# Patient Record
Sex: FEMALE | Race: OTHER | URBAN - METROPOLITAN AREA
[De-identification: names, ages, dates, MRNs, and addresses within clinical notes are randomized per-mention and may not be internally consistent; named-entity substitution may affect disease eponyms.]

---

## 2017-05-13 ENCOUNTER — EMERGENCY (EMERGENCY)
Facility: HOSPITAL | Age: 42
LOS: 1 days | Discharge: HOME | End: 2017-05-13
Attending: EMERGENCY MEDICINE | Admitting: EMERGENCY MEDICINE
Payer: MEDICAID

## 2017-05-13 VITALS
HEART RATE: 83 BPM | TEMPERATURE: 98 F | OXYGEN SATURATION: 100 % | DIASTOLIC BLOOD PRESSURE: 85 MMHG | RESPIRATION RATE: 16 BRPM | SYSTOLIC BLOOD PRESSURE: 122 MMHG

## 2017-05-13 VITALS
DIASTOLIC BLOOD PRESSURE: 72 MMHG | TEMPERATURE: 98 F | RESPIRATION RATE: 16 BRPM | OXYGEN SATURATION: 100 % | SYSTOLIC BLOOD PRESSURE: 125 MMHG | HEART RATE: 80 BPM

## 2017-05-13 DIAGNOSIS — R42 DIZZINESS AND GIDDINESS: ICD-10-CM

## 2017-05-13 DIAGNOSIS — R11.2 NAUSEA WITH VOMITING, UNSPECIFIED: ICD-10-CM

## 2017-05-13 LAB
ALBUMIN SERPL ELPH-MCNC: 3.9 G/DL — SIGNIFICANT CHANGE UP (ref 3.4–5)
ALP SERPL-CCNC: 55 U/L — SIGNIFICANT CHANGE UP (ref 40–120)
ALT FLD-CCNC: 22 U/L — SIGNIFICANT CHANGE UP (ref 12–42)
ANION GAP SERPL CALC-SCNC: 7 MMOL/L — LOW (ref 9–16)
APPEARANCE UR: CLEAR — SIGNIFICANT CHANGE UP
AST SERPL-CCNC: 20 U/L — SIGNIFICANT CHANGE UP (ref 15–37)
BILIRUB SERPL-MCNC: 0.3 MG/DL — SIGNIFICANT CHANGE UP (ref 0.2–1.2)
BILIRUB UR-MCNC: NEGATIVE — SIGNIFICANT CHANGE UP
BUN SERPL-MCNC: 9 MG/DL — SIGNIFICANT CHANGE UP (ref 7–23)
CALCIUM SERPL-MCNC: 9.3 MG/DL — SIGNIFICANT CHANGE UP (ref 8.5–10.5)
CHLORIDE SERPL-SCNC: 103 MMOL/L — SIGNIFICANT CHANGE UP (ref 96–108)
CO2 SERPL-SCNC: 30 MMOL/L — SIGNIFICANT CHANGE UP (ref 22–31)
COLOR SPEC: YELLOW — SIGNIFICANT CHANGE UP
CREAT SERPL-MCNC: 0.62 MG/DL — SIGNIFICANT CHANGE UP (ref 0.5–1.3)
DIFF PNL FLD: (no result)
GLUCOSE SERPL-MCNC: 107 MG/DL — HIGH (ref 70–99)
GLUCOSE UR QL: NEGATIVE — SIGNIFICANT CHANGE UP
HCG UR QL: NEGATIVE — SIGNIFICANT CHANGE UP
HCT VFR BLD CALC: 36.9 % — SIGNIFICANT CHANGE UP (ref 34.5–45)
HGB BLD-MCNC: 12.9 G/DL — SIGNIFICANT CHANGE UP (ref 11.5–15.5)
HIV 1 & 2 AB SERPL IA.RAPID: SIGNIFICANT CHANGE UP
KETONES UR-MCNC: NEGATIVE — SIGNIFICANT CHANGE UP
LEUKOCYTE ESTERASE UR-ACNC: NEGATIVE — SIGNIFICANT CHANGE UP
MCHC RBC-ENTMCNC: 30.7 PG — SIGNIFICANT CHANGE UP (ref 27–34)
MCHC RBC-ENTMCNC: 35 G/DL — SIGNIFICANT CHANGE UP (ref 32–36)
MCV RBC AUTO: 87.9 FL — SIGNIFICANT CHANGE UP (ref 80–100)
NITRITE UR-MCNC: NEGATIVE — SIGNIFICANT CHANGE UP
PH UR: 6 — SIGNIFICANT CHANGE UP (ref 5–8)
PLATELET # BLD AUTO: 218 K/UL — SIGNIFICANT CHANGE UP (ref 150–400)
POTASSIUM SERPL-MCNC: 3.5 MMOL/L — SIGNIFICANT CHANGE UP (ref 3.5–5.3)
POTASSIUM SERPL-SCNC: 3.5 MMOL/L — SIGNIFICANT CHANGE UP (ref 3.5–5.3)
PROT SERPL-MCNC: 7.9 G/DL — SIGNIFICANT CHANGE UP (ref 6.4–8.2)
PROT UR-MCNC: NEGATIVE MG/DL — SIGNIFICANT CHANGE UP
RBC # BLD: 4.2 M/UL — SIGNIFICANT CHANGE UP (ref 3.8–5.2)
RBC # FLD: 11.9 % — SIGNIFICANT CHANGE UP (ref 10.3–16.9)
SODIUM SERPL-SCNC: 140 MMOL/L — SIGNIFICANT CHANGE UP (ref 132–145)
SP GR SPEC: <=1.005 — SIGNIFICANT CHANGE UP (ref 1–1.03)
UROBILINOGEN FLD QL: 0.2 E.U./DL — SIGNIFICANT CHANGE UP
WBC # BLD: 9.6 K/UL — SIGNIFICANT CHANGE UP (ref 3.8–10.5)
WBC # FLD AUTO: 9.6 K/UL — SIGNIFICANT CHANGE UP (ref 3.8–10.5)

## 2017-05-13 PROCEDURE — 99284 EMERGENCY DEPT VISIT MOD MDM: CPT | Mod: 25

## 2017-05-13 PROCEDURE — 93010 ELECTROCARDIOGRAM REPORT: CPT

## 2017-05-13 RX ORDER — KETOROLAC TROMETHAMINE 30 MG/ML
30 SYRINGE (ML) INJECTION ONCE
Qty: 0 | Refills: 0 | Status: DISCONTINUED | OUTPATIENT
Start: 2017-05-13 | End: 2017-05-13

## 2017-05-13 RX ORDER — ONDANSETRON 8 MG/1
1 TABLET, FILM COATED ORAL
Qty: 15 | Refills: 0 | OUTPATIENT
Start: 2017-05-13

## 2017-05-13 RX ORDER — SODIUM CHLORIDE 9 MG/ML
1000 INJECTION INTRAMUSCULAR; INTRAVENOUS; SUBCUTANEOUS ONCE
Qty: 0 | Refills: 0 | Status: COMPLETED | OUTPATIENT
Start: 2017-05-13 | End: 2017-05-13

## 2017-05-13 RX ORDER — ONDANSETRON 8 MG/1
8 TABLET, FILM COATED ORAL ONCE
Qty: 0 | Refills: 0 | Status: COMPLETED | OUTPATIENT
Start: 2017-05-13 | End: 2017-05-13

## 2017-05-13 RX ORDER — ONDANSETRON 8 MG/1
4 TABLET, FILM COATED ORAL ONCE
Qty: 0 | Refills: 0 | Status: COMPLETED | OUTPATIENT
Start: 2017-05-13 | End: 2017-05-13

## 2017-05-13 RX ADMIN — SODIUM CHLORIDE 1000 MILLILITER(S): 9 INJECTION INTRAMUSCULAR; INTRAVENOUS; SUBCUTANEOUS at 15:40

## 2017-05-13 RX ADMIN — ONDANSETRON 8 MILLIGRAM(S): 8 TABLET, FILM COATED ORAL at 21:26

## 2017-05-13 RX ADMIN — ONDANSETRON 4 MILLIGRAM(S): 8 TABLET, FILM COATED ORAL at 16:09

## 2017-05-13 RX ADMIN — Medication 30 MILLIGRAM(S): at 21:00

## 2017-05-13 NOTE — ED PROVIDER NOTE - CONSTITUTIONAL, MLM
normal... Well appearing, well nourished, awake, alert, oriented to person, place, time/situation. Appears in mild distress, reports nausea.

## 2017-05-13 NOTE — ED PROVIDER NOTE - NS ED MD SCRIBE ATTENDING SCRIBE SECTIONS
HIV/DISPOSITION/VITAL SIGNS( Pullset)/HISTORY OF PRESENT ILLNESS/REVIEW OF SYSTEMS/PHYSICAL EXAM/RESULTS/PAST MEDICAL/SURGICAL/SOCIAL HISTORY

## 2017-05-13 NOTE — ED ADULT NURSE REASSESSMENT NOTE - NS ED NURSE REASSESS COMMENT FT1
Pt reports dizziness improved but now has a headache. Pt given toradol as per Dr. Vasquez and pt stating she will leave when the pain improves.

## 2017-05-13 NOTE — ED PROVIDER NOTE - OBJECTIVE STATEMENT
42 y/o F with pmhx anemia brought in by EMS after an episode of dizziness and nausea/vomiting today. Pt reports she has had intermittent dizziness this week with associated fatigue and feeling she needs to sit down and catch her breath. Has had dizziness in the past but did not take anything for it. Reports today she was on her way to  food but before eating needed to go to the bathroom to vomit and afterwards felt dizzy and "cloudy". Had another episode of vomiting with EMS. No longer feels dizzy here but is still nauseous. Pt reports she is supposed to take iron for anemia but does not take regularly and has not taken this week. Denies abdominal pain, chest pain, HA. No sick contacts.

## 2017-05-13 NOTE — ED PROVIDER NOTE - MEDICAL DECISION MAKING DETAILS
Pt with dizziness and nausea/vomiting today, now resolved but still with nausea. Will check labs, give Zofran for nausea and hydrate.

## 2021-07-13 NOTE — ED PROVIDER NOTE - PROGRESS NOTE DETAILS
[facility] PRIMARY [facility]  [address] [facility] [address]  Dept: 92 Jerry Bhardwaj is a 42 y.o. female Established patient, who presents today for her medical conditions/complaints as noted below. Chief Complaint   Patient presents with    Medication Check    Headache     pt c/o headache starts on left side moves to the middle x 2-3days    Anxiety     pt c/o possible attacks in the past month       HPI:     HPI  Patient here for medication check. States blood pressures been running higher outside the office. Denies any chest pain. States anxiety has been increasing lately. Recently over the past 5 days restarted her Lexapro. Patient had discontinued. Patient also wondering if she is going through menopause. Has been having hot flashes. Is status post hysterectomy but ovaries still remain. No lightheadedness or dizziness. Weight was noted to be. Reviewed prior notes None  Reviewed previous Labs    LDL Calculated (mg/dL)   Date Value   09/28/2019 115   07/13/2019 121       (goal LDL is <100)   BUN (mg/dL)   Date Value   07/13/2019 13     TSH (uIU/mL)   Date Value   08/26/2016 2.920     BP Readings from Last 3 Encounters:   07/13/21 132/80   11/26/19 138/88   10/30/19 126/82          (goal 120/80)    Past Medical History:   Diagnosis Date    H/O contact dermatitis     H/O essential hypertension       Past Surgical History:   Procedure Laterality Date    HYSTERECTOMY, VAGINAL  2010    both ovaries remain       Family History   Problem Relation Age of Onset    High Blood Pressure Mother     Seizures Mother     High Blood Pressure Father     Seizures Brother        Social History     Tobacco Use    Smoking status: Never Smoker    Smokeless tobacco: Never Used   Substance Use Topics    Alcohol use:  Yes     Alcohol/week: 0.0 standard drinks     Comment: rarly      Current Outpatient Medications   Medication Sig Dispense Refill    carvedilol (COREG) 12.5 MG tablet Take 1 tablet by mouth 2 times daily (with meals) 180 tablet 3    escitalopram (LEXAPRO) 5 MG tablet Take 1 tablet by mouth daily 30 tablet 5     No current facility-administered medications for this visit. Allergies   Allergen Reactions    Food      Various unknown allergies to food.  Penicillins Hives    Eggs Or Egg-Derived Products        Health Maintenance   Topic Date Due    Hepatitis C screen  Never done    COVID-19 Vaccine (1) Never done    HIV screen  Never done    Potassium monitoring  09/28/2020    Creatinine monitoring  09/28/2020    Flu vaccine (1) 09/01/2021    Lipid screen  09/28/2024    DTaP/Tdap/Td vaccine (3 - Td or Tdap) 01/30/2028    Hepatitis A vaccine  Aged Out    Hepatitis B vaccine  Aged Out    Hib vaccine  Aged Out    Meningococcal (ACWY) vaccine  Aged Out    Pneumococcal 0-64 years Vaccine  Aged Out       Subjective:      Review of Systems   Constitutional: Negative for chills and fever. HENT: Negative for rhinorrhea and sore throat. Eyes: Negative for discharge and redness. Respiratory: Negative for cough, shortness of breath and wheezing. Cardiovascular: Negative for chest pain and palpitations. Gastrointestinal: Negative for abdominal pain, diarrhea, nausea and vomiting. Genitourinary: Negative for dysuria and frequency. Musculoskeletal: Negative for arthralgias and myalgias. Neurological: Negative for dizziness, light-headedness and headaches. Psychiatric/Behavioral: Positive for dysphoric mood. Negative for sleep disturbance. Objective:     /80   Pulse 81   Ht 5' (1.524 m)   Wt 212 lb 12.8 oz (96.5 kg)   SpO2 99%   BMI 41.56 kg/m²   Physical Exam  Vitals and nursing note reviewed. Constitutional:       General: She is not in acute distress. Appearance: She is well-developed. She is not ill-appearing. HENT:      Head: Normocephalic and atraumatic.       Right Ear: External ear normal. Left Ear: External ear normal.   Eyes:      General: No scleral icterus. Right eye: No discharge. Left eye: No discharge. Conjunctiva/sclera: Conjunctivae normal.      Pupils: Pupils are equal, round, and reactive to light. Neck:      Thyroid: No thyromegaly. Trachea: No tracheal deviation. Cardiovascular:      Rate and Rhythm: Normal rate and regular rhythm. Heart sounds: Normal heart sounds. Pulmonary:      Effort: Pulmonary effort is normal. No respiratory distress. Breath sounds: Normal breath sounds. No wheezing. Lymphadenopathy:      Cervical: No cervical adenopathy. Skin:     General: Skin is warm. Findings: No rash. Neurological:      Mental Status: She is alert and oriented to person, place, and time. Psychiatric:         Mood and Affect: Mood normal.         Behavior: Behavior normal.         Thought Content: Thought content normal.         Assessment:       Diagnosis Orders   1. Benign essential hypertension  carvedilol (COREG) 12.5 MG tablet   2. Morbid obesity due to excess calories (HCC)  TSH    T4, Free   3. Encounter for lipid screening for cardiovascular disease  Lipid, Fasting   4. Annual physical exam  Basic Metabolic Panel, Fasting    Hepatic Function Panel   5. Hot flashes  Follicle Stimulating Hormone        Plan:    Blood work ordered. Increase Coreg to 12.5 mg twice daily. We will check [address] [place] [address] to see if patient is perimenopausal.  May need to increase Lexapro in 3 weeks. Return in about 3 months (around 10/13/2021).     Orders Placed This Encounter   Procedures    Lipid, Fasting     Standing Status:   Future     Standing Expiration Date:   7/13/2022    Basic Metabolic Panel, Fasting     Standing Status:   Future     Standing Expiration Date:   7/13/2022    Hepatic Function Panel     Standing Status:   Future     Standing Expiration Date:   7/13/2022    TSH     Standing Status:   Future     Standing Expiration Date:   7/13/2022   Alek Maldonado Labs wnl, had some more nausea, now feeling better, slight headache. Will d/c with Zofran.

## 2021-11-29 NOTE — ED PROVIDER NOTE - NEURO NEGATIVE STATEMENT, MLM
+dizziness. no loss of consciousness, no gait abnormality, no headache, no sensory deficits, and no weakness.
Oriented to time, place, person, situation

## 2023-07-25 PROBLEM — Z00.00 ENCOUNTER FOR PREVENTIVE HEALTH EXAMINATION: Status: ACTIVE | Noted: 2023-07-25

## 2023-07-26 ENCOUNTER — APPOINTMENT (OUTPATIENT)
Dept: UROLOGY | Facility: CLINIC | Age: 48
End: 2023-07-26
Payer: MEDICAID

## 2023-07-26 ENCOUNTER — LABORATORY RESULT (OUTPATIENT)
Age: 48
End: 2023-07-26

## 2023-07-26 VITALS
TEMPERATURE: 97.6 F | WEIGHT: 165 LBS | HEIGHT: 66 IN | BODY MASS INDEX: 26.52 KG/M2 | DIASTOLIC BLOOD PRESSURE: 72 MMHG | SYSTOLIC BLOOD PRESSURE: 109 MMHG | HEART RATE: 80 BPM

## 2023-07-26 DIAGNOSIS — Z87.891 PERSONAL HISTORY OF NICOTINE DEPENDENCE: ICD-10-CM

## 2023-07-26 DIAGNOSIS — Z84.1 FAMILY HISTORY OF DISORDERS OF KIDNEY AND URETER: ICD-10-CM

## 2023-07-26 DIAGNOSIS — Z78.9 OTHER SPECIFIED HEALTH STATUS: ICD-10-CM

## 2023-07-26 DIAGNOSIS — N39.46 MIXED INCONTINENCE: ICD-10-CM

## 2023-07-26 PROCEDURE — 99204 OFFICE O/P NEW MOD 45 MIN: CPT

## 2023-07-26 NOTE — PHYSICAL EXAM
[General Appearance - Well Developed] : well developed [General Appearance - Well Nourished] : well nourished [Normal Appearance] : normal appearance [Well Groomed] : well groomed [General Appearance - In No Acute Distress] : no acute distress [Abdomen Soft] : soft [Abdomen Tenderness] : non-tender [Costovertebral Angle Tenderness] : no ~M costovertebral angle tenderness [Normal Station and Gait] : the gait and station were normal for the patient's age [Oriented To Time, Place, And Person] : oriented to person, place, and time [Affect] : the affect was normal [Mood] : the mood was normal [Not Anxious] : not anxious [Urinary Bladder Findings] : the bladder was normal on palpation [] : no rash [No Focal Deficits] : no focal deficits [External Female Genitalia] : normal external genitalia [Vagina] : normal vaginal exam

## 2023-07-27 NOTE — ASSESSMENT
[FreeTextEntry1] : 48 YO female, healthy\par 5-year onset of mixed urinary incontinence (mainly stress incontinence)\par worsened over the last year\par wears liners\par nocturia x4-5\par ,  deliveries\par hx UTIs, not recurrent\par briefly discussed surgical intervention\par \par try kegel exercises\par provided written materials in [place]\par urine C+S today\par refer to Dr. Melendez for further evaluation

## 2023-07-27 NOTE — END OF VISIT
[FreeTextEntry3] : I, Dr. Rich, personally performed the evaluation and management (E/M) services for this new patient who presents today with (a) new problem(s)/exacerbation of (an) existing condition(s).  That E/M includes conducting the examination, assessing all new/exacerbated conditions, and establishing a new plan of care.  Today, our ACP, Melissa Blanco, was here to observe the evaluation and management services for this new problem/exacerbated condition to be followed going forward.\par

## 2023-07-27 NOTE — HISTORY OF PRESENT ILLNESS
[FreeTextEntry1] : 46 YO female, healthy\par referred by Dr. Hal Patel\par 5-year onset of mixed urinary incontinence (mainly stress incontinence)\par worsened over the last year\par nocturia x4-5\par wears liners, changes 2-3 times daily (wets clothes at times with strong cough)\par has not tried kegel exercises\par reports 1 episode of "gross hematuria" a month ago, but attributes this to irregular periods/perimenopause\par denies dysuria \par hx of UTIs, last one was 8 months ago, treated with antibiotics\par UTIs not recurrent\par denies sensation of prolapse\par ,  deliveries\par \par \par \par \par

## 2023-12-19 PROBLEM — D64.9 ANEMIA, UNSPECIFIED: Chronic | Status: ACTIVE | Noted: 2017-05-13

## 2024-05-10 NOTE — ED ADULT NURSE NOTE - NS ED NURSE LEVEL OF CONSCIOUSNESS AFFECT
Patient: Halima Razo    Procedure: Procedure(s):  ABDOMINOPLASTY, WITH PANNICULECTOMY       Anesthesia Type:  General    Note:  Disposition: Outpatient   Postop Pain Control: Uneventful            Sign Out: Well controlled pain   PONV: No   Neuro/Psych: Uneventful            Sign Out: Acceptable/Baseline neuro status   Airway/Respiratory: Uneventful            Sign Out: Acceptable/Baseline resp. status   CV/Hemodynamics: Uneventful            Sign Out: Acceptable CV status; No obvious hypovolemia; No obvious fluid overload   Other NRE: NONE   DID A NON-ROUTINE EVENT OCCUR? No           Last vitals:  Vitals Value Taken Time   /85 05/10/24 0930   Temp 97.5  F (36.4  C) 05/10/24 0913   Pulse 86 05/10/24 0940   Resp 35 05/10/24 0940   SpO2 95 % 05/10/24 0940   Vitals shown include unfiled device data.    Electronically Signed By: Román Collins MD  May 10, 2024  10:32 AM   Calm/Appropriate

## 2024-12-12 NOTE — ED PROVIDER NOTE - CPE EDP NEURO NORM
"RAPID RESPONSE NURSE AI ALERT       AI alert received.    Chart Reviewed: 12/11/2024, 11:55 PM    MRN: [id number]  Bed: ED 23/23    Dx: Altered mental status    Reza Morrow has a past medical history of Behavioral problem, Bulging lumbar disc, Bursitis, Degenerative cervical disc, psychiatric care, Hypercholesteremia, Labral tear of hip, degenerative, MS (multiple sclerosis), Paresthesia of both lower extremities, Pneumonia, and Spinal cord compression.    Last VS: BP (!) 145/79 (BP Location: Right arm, Patient Position: Lying)   Pulse 108   Temp 98.7 °F (37.1 °C) (Oral)   Resp 17   Ht 5' 2" (1.575 m)   Wt 54.4 kg (120 lb)   SpO2 98%   Breastfeeding No   BMI 21.95 kg/m²     24H Vital Sign Range:  Temp:  [98.6 °F (37 °C)-99.8 °F (37.7 °C)]   Pulse:  [108-116]   Resp:  [16-34]   BP: (131-184)/()   SpO2:  [95 %-100 %]     Level of Consciousness (AVPU): alert    Recent Labs     12/11/24  1734   WBC 11.35   HGB 17.0*   HCT 48.8*          Recent Labs     12/11/24  1734      K 3.7      CO2 22*   BUN 24*   CREATININE 0.8   *        Recent Labs     12/11/24  1745   PH 7.572*   PCO2 20.9*   PO2 33*   HCO3 19.3*   POCSATURATED 75   BE -3*        OXYGEN:      MEWS score:      Bedside RN, Dejon contacted, reports no concerns. Current .  No additional concerns verbalized at this time. Instructed to call [phone] for further concerns or assistance.    Micaela Maharaj RN       " normal...